# Patient Record
Sex: MALE | Race: WHITE
[De-identification: names, ages, dates, MRNs, and addresses within clinical notes are randomized per-mention and may not be internally consistent; named-entity substitution may affect disease eponyms.]

---

## 2019-01-23 ENCOUNTER — HOSPITAL ENCOUNTER (EMERGENCY)
Dept: HOSPITAL 14 - H.ER | Age: 24
Discharge: HOME | End: 2019-01-23
Payer: COMMERCIAL

## 2019-01-23 VITALS
DIASTOLIC BLOOD PRESSURE: 73 MMHG | RESPIRATION RATE: 17 BRPM | HEART RATE: 81 BPM | SYSTOLIC BLOOD PRESSURE: 127 MMHG | TEMPERATURE: 98.1 F

## 2019-01-23 VITALS — BODY MASS INDEX: 24.3 KG/M2

## 2019-01-23 VITALS — OXYGEN SATURATION: 98 %
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
## 2019-01-23 DIAGNOSIS — T21.11XA: Primary | ICD-10-CM

## 2019-01-23 NOTE — ED PDOC
HPI: Skin/Bite Injury


Time Seen by Provider: 19 09:47


Chief Complaint (Nursing): Abnormal Skin Integrity


Chief Complaint (Provider): Skin burning


History Per: Patient


History/Exam Limitations: no limitations


Additional Complaint(s): 





Pt reports burning to skin on chest and back since 5:30 AM today, minimal 

itching.  Did not take OTC meds.  States he was went to tanning bed 3 days ago. 

Denies SOB, throat swelling. 





Past Medical History


Reviewed: Nursing Documentation, Vital Signs


Vital Signs: 





                                Last Vital Signs











Temp  97.9 F   19 09:33


 


Pulse  87   19 09:33


 


Resp  20   19 09:33


 


BP  148/93 H  19 09:33


 


Pulse Ox  98   19 09:33














- Medical History


PMH: No Chronic Diseases


   Denies: Chronic Kidney Disease





- Surgical History


Surgical History: Appendectomy





- Family History


Family History: States: Unknown Family Hx





- Social History


Current smoker - smoking cessation education provided: No


Alcohol: None





- Home Medications


Home Medications: 


                                Ambulatory Orders











 Medication  Instructions  Recorded


 


Lidocaine/Aloe Vera [Aloe Vera 170 gm TP DAILY PRN #1 spray 19





Burn Relief Spray]  














- Allergies


Allergies/Adverse Reactions: 


                                    Allergies











Allergy/AdvReac Type Severity Reaction Status Date / Time


 


No Known Allergies Allergy   Verified 10/17/14 00:44














Review of Systems


Constitutional: Negative for: Fever, Chills


Respiratory: Negative for: Cough, Shortness of Breath


Skin: Positive for: Rash.  Negative for: Lesions, Jaundice





Physical Exam





- Reviewed


Nursing Documentation Reviewed: Yes


Vital Signs Reviewed: Yes





- Physical Exam


Appears: Positive for: Well, No Acute Distress


Skin: Positive for: Rash (Erythema chest/abdomen/back, no induration, no 

lesions, blanching, no vesicles, no TTP, no crepitus)


Cardiovascular/Chest: Positive for: Regular Rate, Rhythm


Respiratory: Positive for: Normal Breath Sounds


Neurologic/Psych: Positive for: Alert, Oriented





- ECG


O2 Sat by Pulse Oximetry: 98





Medical Decision Making


Medical Decision Makin yo male with rash.


- Benadryl


- Pepcid


- Prednisone





Disposition





- Clinical Impression


Clinical Impression: 


 First degree burn








- Disposition


Referrals: 


[facility] [Outside]


Imtiaz Moore MD [Staff Provider] - 


Disposition: Routine/Home


Disposition Time: 12:05


Condition: IMPROVED


Prescriptions: 


Lidocaine/Aloe Vera [Aloe Vera Burn Relief [place]] 170 gm TP DAILY PRN #1 spray


 PRN Reason: Allergy Symptoms


Instructions:  Skin Burns


Forms:  [facility] ([place]ian)


Print Language: [place]ese